# Patient Record
Sex: MALE | Race: AMERICAN INDIAN OR ALASKA NATIVE
[De-identification: names, ages, dates, MRNs, and addresses within clinical notes are randomized per-mention and may not be internally consistent; named-entity substitution may affect disease eponyms.]

---

## 2017-06-13 ENCOUNTER — HOSPITAL ENCOUNTER (EMERGENCY)
Dept: HOSPITAL 5 - ED | Age: 27
Discharge: LEFT BEFORE DISCHARGE | End: 2017-06-13
Payer: MEDICAID

## 2017-06-13 VITALS — DIASTOLIC BLOOD PRESSURE: 85 MMHG | SYSTOLIC BLOOD PRESSURE: 137 MMHG

## 2017-06-13 DIAGNOSIS — Z76.0: Primary | ICD-10-CM

## 2017-06-13 DIAGNOSIS — Z53.21: ICD-10-CM

## 2017-06-13 LAB
ALBUMIN SERPL-MCNC: 4.4 G/DL (ref 3.9–5)
ALBUMIN/GLOB SERPL: 1.6 %
ALP SERPL-CCNC: 85 UNITS/L (ref 35–129)
ALT SERPL-CCNC: 11 UNITS/L (ref 7–56)
ANION GAP SERPL CALC-SCNC: 19 MMOL/L
BASOPHILS NFR BLD AUTO: 1 % (ref 0–1.8)
BILIRUB SERPL-MCNC: 0.3 MG/DL (ref 0.1–1.2)
BILIRUB UR QL STRIP: (no result)
BLOOD UR QL VISUAL: (no result)
BUN SERPL-MCNC: 9 MG/DL (ref 9–20)
BUN/CREAT SERPL: 11.25 %
CALCIUM SERPL-MCNC: 8.9 MG/DL (ref 8.4–10.2)
CHLORIDE SERPL-SCNC: 101.8 MMOL/L (ref 98–107)
CO2 SERPL-SCNC: 26 MMOL/L (ref 22–30)
EOSINOPHIL NFR BLD AUTO: 0.7 % (ref 0–4.3)
GLUCOSE SERPL-MCNC: 102 MG/DL (ref 75–100)
HCT VFR BLD CALC: 44.9 % (ref 35.5–45.6)
HGB BLD-MCNC: 14.9 GM/DL (ref 11.8–15.2)
KETONES UR STRIP-MCNC: (no result) MG/DL
LEUKOCYTE ESTERASE UR QL STRIP: (no result)
MCH RBC QN AUTO: 29 PG (ref 28–32)
MCHC RBC AUTO-ENTMCNC: 33 % (ref 32–34)
MCV RBC AUTO: 86 FL (ref 84–94)
MUCOUS THREADS #/AREA URNS HPF: (no result) /HPF
NITRITE UR QL STRIP: (no result)
PH UR STRIP: 6 [PH] (ref 5–7)
PLATELET # BLD: 247 K/MM3 (ref 140–440)
POTASSIUM SERPL-SCNC: 3.7 MMOL/L (ref 3.6–5)
PROT SERPL-MCNC: 7.2 G/DL (ref 6.3–8.2)
RBC # BLD AUTO: 5.2 M/MM3 (ref 3.65–5.03)
RBC #/AREA URNS HPF: 4 /HPF (ref 0–6)
SODIUM SERPL-SCNC: 143 MMOL/L (ref 137–145)
URINE DRUGS OF ABUSE NOTE: (no result)
UROBILINOGEN UR-MCNC: 2 MG/DL (ref ?–2)
WBC # BLD AUTO: 10 K/MM3 (ref 4.5–11)
WBC #/AREA URNS HPF: 3 /HPF (ref 0–6)

## 2017-06-13 PROCEDURE — 85025 COMPLETE CBC W/AUTO DIFF WBC: CPT

## 2017-06-13 PROCEDURE — 36415 COLL VENOUS BLD VENIPUNCTURE: CPT

## 2017-06-13 PROCEDURE — 81001 URINALYSIS AUTO W/SCOPE: CPT

## 2017-06-13 PROCEDURE — 80307 DRUG TEST PRSMV CHEM ANLYZR: CPT

## 2017-06-13 PROCEDURE — 80053 COMPREHEN METABOLIC PANEL: CPT

## 2017-06-13 NOTE — EMERGENCY DEPARTMENT REPORT
Entered by JADON VASQUEZ, acting as scribe for LIZ STERLING NP.


Chief Complaint: Psych


Stated Complaint: PSYCH MEDS


Time Seen by Provider: 06/13/17 16:48





- HPI


History of Present Illness: 





28 y/o male went to get his monthly injection of Invega, but he could not get 

it because he was acting odd and his bp was low. Denies pain





- ROS


Review of Systems: 





- pain





- Exam


Vital Signs: 


 Vital Signs











  06/13/17





  16:50


 


Temperature 99.1 F


 


Pulse Rate 77


 


Respiratory 18





Rate 


 


Blood Pressure 137/85


 


O2 Sat by Pulse 100





Oximetry 











Physical Exam: 


Constitutional: The patient is a well-developed, well-nourished, in no apparent 

distress. 


PT has a flat affect and is withdrawn 





MSE screening note: 


Focused history and physical exam performed.


Due to findings the following was ordered:





labs 





ED Disposition for MSE


Condition: Stable





This documentation as recorded by the scribe,JADON VASQUEZ,accurately 

reflects the service I personally performed and the decisions made by me,LIZ STERLING NP.

## 2019-12-14 ENCOUNTER — HOSPITAL ENCOUNTER (EMERGENCY)
Dept: HOSPITAL 5 - ED | Age: 29
LOS: 1 days | Discharge: OTHER INSTITUTION | End: 2019-12-15
Payer: MEDICAID

## 2019-12-14 DIAGNOSIS — F25.0: Primary | ICD-10-CM

## 2019-12-14 DIAGNOSIS — F17.200: ICD-10-CM

## 2019-12-14 LAB
ALBUMIN SERPL-MCNC: 4.9 G/DL (ref 3.9–5)
ALT SERPL-CCNC: 15 UNITS/L (ref 7–56)
BASOPHILS # (AUTO): 0.1 K/MM3 (ref 0–0.1)
BASOPHILS NFR BLD AUTO: 0.9 % (ref 0–1.8)
BENZODIAZEPINES SCREEN,URINE: (no result)
BILIRUB DIRECT SERPL-MCNC: < 0.2 MG/DL (ref 0–0.2)
BILIRUB UR QL STRIP: (no result)
BLOOD UR QL VISUAL: (no result)
BUN SERPL-MCNC: 8 MG/DL (ref 9–20)
BUN/CREAT SERPL: 9 %
CALCIUM SERPL-MCNC: 9.2 MG/DL (ref 8.4–10.2)
EOSINOPHIL # BLD AUTO: 0 K/MM3 (ref 0–0.4)
EOSINOPHIL NFR BLD AUTO: 0.2 % (ref 0–4.3)
HCT VFR BLD CALC: 42.7 % (ref 35.5–45.6)
HEMOLYSIS INDEX: 9
HGB BLD-MCNC: 14.4 GM/DL (ref 11.8–15.2)
LYMPHOCYTES # BLD AUTO: 1.2 K/MM3 (ref 1.2–5.4)
LYMPHOCYTES NFR BLD AUTO: 13.6 % (ref 13.4–35)
MCHC RBC AUTO-ENTMCNC: 34 % (ref 32–34)
MCV RBC AUTO: 87 FL (ref 84–94)
METHADONE SCREEN,URINE: (no result)
MONOCYTES # (AUTO): 0.7 K/MM3 (ref 0–0.8)
MONOCYTES % (AUTO): 7.7 % (ref 0–7.3)
MUCOUS THREADS #/AREA URNS HPF: (no result) /HPF
OPIATE SCREEN,URINE: (no result)
PH UR STRIP: 6 [PH] (ref 5–7)
PLATELET # BLD: 354 K/MM3 (ref 140–440)
PROT UR STRIP-MCNC: (no result) MG/DL
RBC # BLD AUTO: 4.92 M/MM3 (ref 3.65–5.03)
RBC #/AREA URNS HPF: 1 /HPF (ref 0–6)
UROBILINOGEN UR-MCNC: < 2 MG/DL (ref ?–2)
WBC #/AREA URNS HPF: 2 /HPF (ref 0–6)

## 2019-12-14 PROCEDURE — 80307 DRUG TEST PRSMV CHEM ANLYZR: CPT

## 2019-12-14 PROCEDURE — 80320 DRUG SCREEN QUANTALCOHOLS: CPT

## 2019-12-14 PROCEDURE — 82553 CREATINE MB FRACTION: CPT

## 2019-12-14 PROCEDURE — 93005 ELECTROCARDIOGRAM TRACING: CPT

## 2019-12-14 PROCEDURE — 36415 COLL VENOUS BLD VENIPUNCTURE: CPT

## 2019-12-14 PROCEDURE — 80048 BASIC METABOLIC PNL TOTAL CA: CPT

## 2019-12-14 PROCEDURE — 81001 URINALYSIS AUTO W/SCOPE: CPT

## 2019-12-14 PROCEDURE — 85025 COMPLETE CBC W/AUTO DIFF WBC: CPT

## 2019-12-14 PROCEDURE — G0480 DRUG TEST DEF 1-7 CLASSES: HCPCS

## 2019-12-14 PROCEDURE — 82962 GLUCOSE BLOOD TEST: CPT

## 2019-12-14 PROCEDURE — 93010 ELECTROCARDIOGRAM REPORT: CPT

## 2019-12-14 PROCEDURE — 80076 HEPATIC FUNCTION PANEL: CPT

## 2019-12-14 PROCEDURE — 82550 ASSAY OF CK (CPK): CPT

## 2019-12-14 RX ADMIN — ZIPRASIDONE HYDROCHLORIDE SCH MG: 20 CAPSULE ORAL at 16:58

## 2019-12-14 NOTE — EMERGENCY DEPARTMENT REPORT
ED Psych HPI





- General


Chief Complaint: Overdose


Stated Complaint: TOOK MEDS


Time Seen by Provider: 12/14/19 08:44


Source: family


Mode of arrival: Ambulatory





- History of Present Illness


Initial Comments: 


This is a 29-year-old male with a history of schizophrenia.  His last antips

ychotic monthly injection was 11/27/2019.  His mother reports that she came home

last night and found the patient with white stuff in his mouth.  She stated that

she initially thought somebody had given the patient a "Zantac for dry mouth".  

However, the patient told her that he had taken 4 Cogentin which he had been 

previously prescribed.  No one witnessed the event.  The patient denies wanting 

to hurt himself.  Mother reports that the patient was extremely disorganized 

last night and messed up his room.  She states that he is now speaking 

incoherently and she is usually able to understand him.  She states that "he 

goes out side all the time but knows where he is at" when I asked her concerning

the possibility of him wandering.  She states he does have a history of 

hallucinosis.





MD Complaint: other


-: hour(s)


Associated Psychiatric Symptoms: other (overdose and acute psychosis)


History of same: Yes


Quality: constant


Improves With: none


Worsens With: none


Associated Symptoms: denies other symptoms (mother is not reporting any)


Treatments Prior to Arrival: other (usual psychiatric monthly shot)





- Related Data


                                Home Medications











 Medication  Instructions  Recorded  Confirmed  Last Taken


 


No Known Home Medications [No  11/21/16 11/21/16 Unknown





Reported Home Medications]    











                                    Allergies











Allergy/AdvReac Type Severity Reaction Status Date / Time


 


No Known Allergies Allergy   Verified 06/13/17 16:55














ED Review of Systems


ROS: 


Stated complaint: TOOK MEDS


Other details as noted in HPI





Comment: Unobtainable due to pts medical conditions





ED Past Medical Hx





- Past Medical History


Hx Psychiatric Treatment: Yes (schizophrenia / BIPOLAR)





- Social History


Smoking Status: Current Every Day Smoker


Substance Use Type: None





- Medications


Home Medications: 


                                Home Medications











 Medication  Instructions  Recorded  Confirmed  Last Taken  Type


 


No Known Home Medications [No  11/21/16 11/21/16 Unknown History





Reported Home Medications]     














ED Physical Exam





- General


Limitations: Altered Mental Status (acute psychosis)


General appearance: alert, in no apparent distress





- Head


Head exam: Present: atraumatic, normocephalic





- Eye


Eye exam: Present: normal appearance, PERRL.  Absent: scleral icterus





- ENT


ENT exam: Present: mucous membranes moist





- Neck


Neck exam: Present: normal inspection.  Absent: tenderness, meningismus





- Respiratory


Respiratory exam: Present: normal lung sounds bilaterally.  Absent: respiratory 

distress





- Cardiovascular


Cardiovascular Exam: Present: regular rate, normal rhythm.  Absent: systolic 

murmur, diastolic murmur, rubs, gallop





- GI/Abdominal


GI/Abdominal exam: Present: soft, rigid, normal bowel sounds.  Absent: 

distended, tenderness, guarding, rebound





- Rectal


Rectal exam: Present: deferred





- Extremities Exam


Extremities exam: Present: normal inspection





- Back Exam


Back exam: Present: normal inspection





- Neurological Exam


Neurological exam: Present: alert, oriented X3, CN II-XII intact, normal gait.  

Absent: motor sensory deficit





- Psychiatric


Psychiatric exam: Present: agitated (somewhat), anxious, other (disorganized 

thought, somewhat incomprehensible, loose associations)





- Skin


Skin exam: Present: warm, dry, intact, normal color.  Absent: rash





ED Course


                                   Vital Signs











  12/14/19 12/14/19





  07:56 14:12


 


Temperature 98.2 F 98.5 F


 


Pulse Rate 104 H 83


 


Respiratory 22 18





Rate  


 


Blood Pressure 159/89 


 


Blood Pressure  127/70





[Right]  


 


O2 Sat by Pulse 94 99





Oximetry  














- Reevaluation(s)


Reevaluation #1: 


Patient's alleged overdose is essentially benign.  He will be medically cleared.

  He is acutely psychotic, however and may require placement.


12/14/19 09:04





Reevaluation #2: 


Stress with mental health counselor.  The patient will be placed in an inpatient

 psychiatric Hospital.  1013 issued.  Holding orders written.


12/14/19 14:51








ED Medical Decision Making





- Lab Data


Result diagrams: 


                                 12/14/19 08:46





                                 12/14/19 08:46








                         Laboratory Results - last 24 hr











  12/14/19 12/14/19 12/14/19





  08:46 08:46 09:05


 


WBC  9.1  


 


RBC  4.92  


 


Hgb  14.4  


 


Hct  42.7  


 


MCV  87  


 


MCH  29  


 


MCHC  34  


 


RDW  14.5  


 


Plt Count  354  


 


Lymph % (Auto)  13.6  


 


Mono % (Auto)  7.7 H  


 


Eos % (Auto)  0.2  


 


Baso % (Auto)  0.9  


 


Lymph #  1.2  


 


Mono #  0.7  


 


Eos #  0.0  


 


Baso #  0.1  


 


Seg Neutrophils %  77.6 H  


 


Seg Neutrophils #  7.0  


 


Sodium   141 


 


Potassium   3.8 


 


Chloride   101.7 


 


Carbon Dioxide   22 


 


Anion Gap   21 


 


BUN   8 L 


 


Creatinine   0.9 


 


Estimated GFR   > 60 


 


BUN/Creatinine Ratio   9 


 


Glucose   109 H 


 


POC Glucose    82


 


Calcium   9.2 











                         Laboratory Results - last 24 hr











  12/14/19 12/14/19 12/14/19





  08:21 08:46 08:46


 


WBC   9.1 


 


RBC   4.92 


 


Hgb   14.4 


 


Hct   42.7 


 


MCV   87 


 


MCH   29 


 


MCHC   34 


 


RDW   14.5 


 


Plt Count   354 


 


Lymph % (Auto)   13.6 


 


Mono % (Auto)   7.7 H 


 


Eos % (Auto)   0.2 


 


Baso % (Auto)   0.9 


 


Lymph #   1.2 


 


Mono #   0.7 


 


Eos #   0.0 


 


Baso #   0.1 


 


Seg Neutrophils %   77.6 H 


 


Seg Neutrophils #   7.0 


 


Sodium    141


 


Potassium    3.8


 


Chloride    101.7


 


Carbon Dioxide    22


 


Anion Gap    21


 


BUN    8 L


 


Creatinine    0.9


 


Estimated GFR    > 60


 


BUN/Creatinine Ratio    9


 


Glucose    109 H


 


POC Glucose   


 


Calcium    9.2


 


Total Bilirubin   


 


Direct Bilirubin   


 


Indirect Bilirubin   


 


AST   


 


ALT   


 


Alkaline Phosphatase   


 


Total Creatine Kinase   


 


CK-MB (CK-2)   


 


CK-MB (CK-2) Rel Index   


 


Total Protein   


 


Albumin   


 


Albumin/Globulin Ratio   


 


Urine Bilirubin  Neg  


 


Urine RBC (Auto)  1.0  


 


U Epithel Cells (Auto)  < 1.0  


 


Salicylates   


 


Acetaminophen   














  12/14/19 12/14/19 12/14/19





  08:59 08:59 08:59


 


WBC   


 


RBC   


 


Hgb   


 


Hct   


 


MCV   


 


MCH   


 


MCHC   


 


RDW   


 


Plt Count   


 


Lymph % (Auto)   


 


Mono % (Auto)   


 


Eos % (Auto)   


 


Baso % (Auto)   


 


Lymph #   


 


Mono #   


 


Eos #   


 


Baso #   


 


Seg Neutrophils %   


 


Seg Neutrophils #   


 


Sodium   


 


Potassium   


 


Chloride   


 


Carbon Dioxide   


 


Anion Gap   


 


BUN   


 


Creatinine   


 


Estimated GFR   


 


BUN/Creatinine Ratio   


 


Glucose   


 


POC Glucose   


 


Calcium   


 


Total Bilirubin  0.40  


 


Direct Bilirubin  < 0.2  


 


Indirect Bilirubin  0.2  


 


AST  15  


 


ALT  15  


 


Alkaline Phosphatase  87  


 


Total Creatine Kinase  261 H  


 


CK-MB (CK-2)  < 1.0  


 


CK-MB (CK-2) Rel Index  0.3  


 


Total Protein  7.6  


 


Albumin  4.9  


 


Albumin/Globulin Ratio  1.8  


 


Urine Bilirubin   


 


Urine RBC (Auto)   


 


U Epithel Cells (Auto)   


 


Salicylates   < 0.3 L 


 


Acetaminophen    < 5.0 L














  12/14/19





  09:05


 


WBC 


 


RBC 


 


Hgb 


 


Hct 


 


MCV 


 


MCH 


 


MCHC 


 


RDW 


 


Plt Count 


 


Lymph % (Auto) 


 


Mono % (Auto) 


 


Eos % (Auto) 


 


Baso % (Auto) 


 


Lymph # 


 


Mono # 


 


Eos # 


 


Baso # 


 


Seg Neutrophils % 


 


Seg Neutrophils # 


 


Sodium 


 


Potassium 


 


Chloride 


 


Carbon Dioxide 


 


Anion Gap 


 


BUN 


 


Creatinine 


 


Estimated GFR 


 


BUN/Creatinine Ratio 


 


Glucose 


 


POC Glucose  82


 


Calcium 


 


Total Bilirubin 


 


Direct Bilirubin 


 


Indirect Bilirubin 


 


AST 


 


ALT 


 


Alkaline Phosphatase 


 


Total Creatine Kinase 


 


CK-MB (CK-2) 


 


CK-MB (CK-2) Rel Index 


 


Total Protein 


 


Albumin 


 


Albumin/Globulin Ratio 


 


Urine Bilirubin 


 


Urine RBC (Auto) 


 


U Epithel Cells (Auto) 


 


Salicylates 


 


Acetaminophen 














- EKG Data


-: EKG Interpreted by Me


EKG shows normal: sinus rhythm, axis, intervals, QRS complexes, ST-T waves


Rate: tachycardia





- EKG Data


Interpretation: no acute changes


Critical care attestation.: 


If time is entered above; I have spent that time in minutes in the direct care 

of this critically ill patient, excluding procedure time.








ED Disposition


Clinical Impression: 


 Medical clearance for psychiatric admission, Acute psychosis





Schizophrenia


Qualifiers:


 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 

Undifferentiated schizophrenia





Disposition: [id number]/TX-70 ANOTHER TYPE HLTHCARE


Is pt being admited?: No


Does the pt Need Aspirin: No


Condition: Stable


Referrals: 


[facility], [place] [Primary Care Provider] - 3-5 Days


Time of Disposition: 10:02

## 2019-12-15 VITALS — SYSTOLIC BLOOD PRESSURE: 125 MMHG | DIASTOLIC BLOOD PRESSURE: 68 MMHG

## 2019-12-15 RX ADMIN — ZIPRASIDONE HYDROCHLORIDE SCH MG: 20 CAPSULE ORAL at 00:15
